# Patient Record
(demographics unavailable — no encounter records)

---

## 2024-11-07 NOTE — ASSESSMENT
[FreeTextEntry1] : Skin tag - benign, reassurance provided  Inflamed SK - left back  -The patient was informed of the pathophysiology of their lesions and their treatment course with liquid nitrogen (cryosurgery). Side effects include blister formation, hypopigmentation, and scarring, as well as permanent nail dystrophy if applicable. Patient was verbally consented and the lesions identified above were treated with liquid nitrogen freeze, thaw, freeze each cycle x 2. The patient tolerated the procedure well. Wound care instructions, care of a blister with vaseline, signs and symptoms of infection were discussed in full. The patient denies any questions at this time.  Angiomas  - Clinically benign red or purple blood vessel growths - No treatment warranted  RTC 2/2026 TSBE

## 2024-11-07 NOTE — PHYSICAL EXAM
[Alert] : alert [Oriented x 3] : ~L oriented x 3 [Well Nourished] : well nourished [Conjunctiva Non-injected] : conjunctiva non-injected [No Visual Lymphadenopathy] : no visual  lymphadenopathy [No Clubbing] : no clubbing [No Edema] : no edema [No Bromhidrosis] : no bromhidrosis [No Chromhidrosis] : no chromhidrosis [Declined] : declined [FreeTextEntry3] :  Focused skin exam performed (per patient preference). Areas examined as below:   - back with hyperpigmented stuck on plaque w/ erythema  - small red papules  - small pedunculated papule right lower back

## 2024-11-07 NOTE — HISTORY OF PRESENT ILLNESS
[FreeTextEntry1] : spot check on back  [de-identified] : 66yo M here for f/u:   - several spots on back, wife noticed. one has become slightly more scaly and darker. intermittently itchy. No prior skin cancer hx

## 2025-02-05 NOTE — IMAGING
[de-identified] : Normal gait  Lumbosacral spine Inspection-normal Tenderness-no tenderness Straight leg raising-negative bilaterally Motor exam lower extremities-left quads and abductors 4+/5, remainder motor lower extremities normal  Right hip Full painless passive range of motion. No tenderness  Left hip No swelling Full painless passive range of motion.  Mild to moderate tenderness abductor insertion  Both legs No swelling Calves are soft and nontender Posterior tibial pulse 2+ bilaterally [FreeTextEntry1] : Reviewed and interpreted.  Lumbosacral spine AP and lateral views-mild to moderate multilevel disc space narrowing.  Aortic calcification [de-identified] : Reviewed and interpreted.  Pelvis AP with lateral both hips-mild degenerative changes of the hips bilaterally

## 2025-02-05 NOTE — IMAGING
[de-identified] : Normal gait  Lumbosacral spine Inspection-normal Tenderness-no tenderness Straight leg raising-negative bilaterally Motor exam lower extremities-left quads and abductors 4+/5, remainder motor lower extremities normal  Right hip Full painless passive range of motion. No tenderness  Left hip No swelling Full painless passive range of motion.  Mild to moderate tenderness abductor insertion  Both legs No swelling Calves are soft and nontender Posterior tibial pulse 2+ bilaterally [FreeTextEntry1] : Reviewed and interpreted.  Lumbosacral spine AP and lateral views-mild to moderate multilevel disc space narrowing.  Aortic calcification [de-identified] : Reviewed and interpreted.  Pelvis AP with lateral both hips-mild degenerative changes of the hips bilaterally

## 2025-02-05 NOTE — DATA REVIEWED
[MRI] : MRI [Lumbar Spine] : lumbar spine [Report was reviewed and noted in the chart] : The report was reviewed and noted in the chart [FreeTextEntry1] : MRI lumbosacral spine done at [facility] September 18, 2024 reported as: IMPRESSION: Multilevel degenerative lumbar spondylosis. L2-L3: Right paracentral/right foraminal disc herniation focally narrows the right lateral recess and is causing slight posterior displacement and mild compression of the descending right L3 nerve root in this region. Electronically signed by: Jonas Bynum MD 09/18/2024 04:33 PM EDT RP Workstation: [id number]

## 2025-02-05 NOTE — DISCUSSION/SUMMARY
[de-identified] : Moist heat prn Referred to [facility] for therapy program He will avoid irritating activities and sports Tylenol as needed  Impression: Lumbosacral strain/spondylosis Abductor tendinitis left hip

## 2025-02-05 NOTE — DATA REVIEWED
[MRI] : MRI [Lumbar Spine] : lumbar spine [Report was reviewed and noted in the chart] : The report was reviewed and noted in the chart [FreeTextEntry1] : MRI lumbosacral spine done at [facility] September 18, 2024 reported as: IMPRESSION: Multilevel degenerative lumbar spondylosis. L2-L3: Right paracentral/right foraminal disc herniation focally narrows the right lateral recess and is causing slight posterior displacement and mild compression of the descending right L3 nerve root in this region. Electronically signed by: Jonas Bynum MD 09/18/2024 04:33 PM EDT RP Workstation: [id number]

## 2025-02-05 NOTE — DISCUSSION/SUMMARY
[de-identified] : Moist heat prn Referred to [facility] for therapy program He will avoid irritating activities and sports Tylenol as needed  Impression: Lumbosacral strain/spondylosis Abductor tendinitis left hip

## 2025-02-05 NOTE — HISTORY OF PRESENT ILLNESS
[Lower back] : lower back [Left Leg] : left leg [Right Leg] : right leg [Gradual] : gradual [5] : 5 [Radiating] : radiating [Intermittent] : intermittent [Household chores] : household chores [Leisure] : leisure [Rest] : rest [Sitting] : sitting [Retired] : Work status: retired [de-identified] : Patient is a 67-year-old male with onset of pain lower back and left lateral hip several months ago.  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  He had MRI lumbosacral spine He has mild occasional pain in his lower back with change in position.  No radicular complaints.  No numbness or weakness in his lower extremities He has mild to moderate pain left lateral hip after sitting getting up.  Pain with stairs.  Pain when lying on side to sleep.  Seen today with his wife, Amanda [] : Post Surgical Visit: no [FreeTextEntry7] : B Legs  [de-identified] : MRI

## 2025-02-05 NOTE — HISTORY OF PRESENT ILLNESS
[Lower back] : lower back [Left Leg] : left leg [Right Leg] : right leg [Gradual] : gradual [5] : 5 [Radiating] : radiating [Intermittent] : intermittent [Household chores] : household chores [Leisure] : leisure [Rest] : rest [Sitting] : sitting [Retired] : Work status: retired [de-identified] : Patient is a 67-year-old male with onset of pain lower back and left lateral hip several months ago.  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  He had MRI lumbosacral spine He has mild occasional pain in his lower back with change in position.  No radicular complaints.  No numbness or weakness in his lower extremities He has mild to moderate pain left lateral hip after sitting getting up.  Pain with stairs.  Pain when lying on side to sleep.  Seen today with his wife, Amanda [] : Post Surgical Visit: no [FreeTextEntry7] : B Legs  [de-identified] : MRI

## 2025-02-26 NOTE — HISTORY OF PRESENT ILLNESS
[FreeTextEntry1] : Patient presents for skin examination. [de-identified] : Denies new, changing, bleeding or tender lesions on the skin over the past few months.

## 2025-02-26 NOTE — PHYSICAL EXAM
[Alert] : alert [Oriented x 3] : ~L oriented x 3 [Well Nourished] : well nourished [Full Body Skin Exam Performed] : performed [FreeTextEntry3] : A full skin exam was performed including the scalp, face (including lips, ears, nose and eyes), neck, chest, abdomen, back, buttocks, upper extremities and lower extremities.  The patient declined examination of the genitalia.   The exam revealed the following benign growths: [place] pigmented nevi. Seborrheic keratoses. Cherry angioma.

## 2025-07-24 NOTE — PHYSICAL EXAM
[Alert] : alert [No Acute Distress] : in no acute distress [Sclera] : the sclera and conjunctiva were normal [EOMI] : extraocular movements were intact [Normal Outer Ear/Nose] : the ears and nose were normal in appearance [Normal Appearance] : the appearance of the neck was normal [No Respiratory Distress] : no respiratory distress [No Acc Muscle Use] : no accessory muscle use [Respiration, Rhythm And Depth] : normal respiratory rhythm and effort [Auscultation Breath Sounds / Voice Sounds] : lungs were clear to auscultation bilaterally [Normal S1, S2] : normal S1 and S2 [Heart Rate And Rhythm] : heart rate was normal and rhythm regular [Edema] : edema was not present [Bowel Sounds] : normal bowel sounds [Abdomen Tenderness] : non-tender [No Masses] : no abdominal mass palpated [Abdomen Soft] : soft [Cervical Lymph Nodes Enlarged Posterior Bilaterally] : posterior cervical [Supraclavicular Lymph Nodes Enlarged Bilaterally] : supraclavicular [Cervical Lymph Nodes Enlarged Anterior Bilaterally] : anterior cervical, supraclavicular [No Spinal Tenderness] : no spinal tenderness [Normal Gait] : normal gait [No Clubbing, Cyanosis] : no clubbing or cyanosis of the fingernails [Involuntary Movements] : no involuntary movements were seen [Normal Color / Pigmentation] : normal skin color and pigmentation [No Focal Deficits] : no focal deficits [Normal Affect] : the affect was normal [Normal Mood] : the mood was normal [de-identified] : cerumen within left canal

## 2025-07-24 NOTE — REVIEW OF SYSTEMS
[Dysuria] : no dysuria [Incontinence] : no incontinence [Hesitancy] : no urinary hesitancy [Nocturia] : nocturia [Negative] : Heme/Lymph

## 2025-07-24 NOTE — ASSESSMENT
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 Galvin

## 2025-07-24 NOTE — HISTORY OF PRESENT ILLNESS
[Patient reported hearing was normal] : Patient reported hearing was normal [Patient reported colon/rectal/cancer screening was normal] : Patient reported colon/rectal cancer screening was normal [No falls in past year] : Patient reported no falls in the past year [Completely Independent] : Completely independent. [FreeTextEntry1] : 67yoM seen as new patient.  cardiology [facility] Dr. Geo Hanson had recent labs 6/10/25 stress test - normal  crestor 5mg taking qod denies s/e Tchol 203 hdl 41   dm2: synjardy 25-1000mg daily a1c ~8 normally 6.8-7.2 174 this morning 180 later 120 never took acei usually normotensive was following w/ endo bu no longer  Gastroenterologist: Dr. Lm Chapin Callaway [phone] goes reguarly q 3years  Dr. 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